# Patient Record
Sex: MALE | Race: WHITE | NOT HISPANIC OR LATINO | Employment: FULL TIME | URBAN - METROPOLITAN AREA
[De-identification: names, ages, dates, MRNs, and addresses within clinical notes are randomized per-mention and may not be internally consistent; named-entity substitution may affect disease eponyms.]

---

## 2017-12-22 ENCOUNTER — ALLSCRIPTS OFFICE VISIT (OUTPATIENT)
Dept: OTHER | Facility: OTHER | Age: 22
End: 2017-12-22

## 2017-12-23 NOTE — PROGRESS NOTES
Assessment   1  Injury of finger of right hand (959 5) (S69 91XA)   2  Subungual hematoma of fingernail, initial encounter (923 3) (S60 10XA)   3  Body mass index (BMI) 21 0-21 9, adult (V85 1) (Z68 21)    Plan   Injury of finger of right hand    · Sulfamethoxazole-Trimethoprim 800-160 MG Oral Tablet; TAKE 1 TABLET TWICE    DAILY UNTIL FINISHED  Injury of finger of right hand, Subungual hematoma of fingernail, initial encounter    · Incision & drainage abscess simple single - POC; Status:Active - Perform Order;    Requested for:[id number];     Discussion/Summary      Area cleansed with betadine and alcohol  Incision made with 20g needle and small amount of sanguineous drainage expressed  Site cleansed, antibiotic ointment and dressing applied  Advised on war soaks, will cover with antibiotics  follow up for worsening redness, pain, swelling, or purulent drainage of finger  up to date  Possible side effects of new medications were reviewed with the patient/guardian today  The treatment plan was reviewed with the patient/guardian  The patient/guardian understands and agrees with the treatment plan      Chief Complaint   right hand middle finger while at home- smashed it pretty hard      History of Present Illness   HPI: Here today with injury of right third finger that he sustained a few days ago after a heavy tire fell on his finger  The tip of his finger has been bruised with blood underneath the nail  this morning he noticed significant swelling of the distal aspect of the finger with some erythema at base of the nail  attempted to drain the blood under his nail which was successful  significant pain, has mild discomfort if nail is pushed hard purulent drainage  Review of Systems        Constitutional: no fever or chills, feels well, no tiredness, no recent weight loss or weight gain  Musculoskeletal: as noted in HPI  Integumentary: as noted in HPI  Active Problems   1   Allergic rhinitis (477 9) (J30 9)   2  Body mass index (BMI) 21 0-21 9, adult (V85 1) (Z68 21)   3  Rib pain (786 50) (R07 81)    Past Medical History   1  History of Abdominal pain, epigastric (789 06) (R10 13)   2  History of Acute gingivitis (523 00) (K05 00)   3  History of Acute upper respiratory infection (465 9) (J06 9)   4  History of acute gastritis (V12 70) (Z87 19)   5  History of diarrhea (V12 79) (Z87 898)   6  History of ingrown nail (V13 3) (Z87 2)   7  History of Noninfectious gastroenteritis (558 9) (K52 9)   8  History of Nonvenomous Insect Bite Of Shoulder (912 4)  Active Problems And Past Medical History Reviewed: The active problems and past medical history were reviewed and updated today  Family History   Mother    1  Family history of hypertension (V17 49) (Z82 49)  Father    2  Family history of hypertension (V17 49) (Z82 49)    Social History    · Never a smoker  The social history was reviewed and is unchanged  Current Meds    1  No Reported Medications Recorded     The medication list was reviewed and updated today  Allergies   1  Penicillins    Vitals    Recorded: [id number] 02:33PM   Temperature 98 F   Heart Rate 84   Respiration 16   Systolic 003   Diastolic 72   Height 6 ft    Weight 161 lb    BMI Calculated 21 84   BSA Calculated 1 94     Physical Exam        Constitutional      General appearance: No acute distress, well appearing and well nourished  Pulmonary      Respiratory effort: No increased work of breathing or signs of respiratory distress  Auscultation of lungs: Clear to auscultation, equal breath sounds bilaterally, no wheezes, no rales, no rhonci  Cardiovascular      Auscultation of heart: Normal rate and rhythm, normal S1 and S2, without murmurs  Examination of extremities for edema and/or varicosities: Normal        Skin right third finger distal joint ecchymotic and swollen  Subungual hematoma resolving   Swelling at base of nail, nail appears to be detache underneath skin surface  Psychiatric      Mood and affect: Normal           Results/Data   PHQ-2 Adult Depression Screening [id number] 02:39PM User, Ahs      Test Name Result Flag Reference   PHQ-2 Adult Depression Score 0     Over the last two weeks, how often have you been bothered by any of the following problems? Little interest or pleasure in doing things: Not at all - 0     Feeling down, depressed, or hopeless: Not at all - 0   PHQ-2 Adult Depression Screening Negative          Attending Note   Collaborating Physician Note: Collaborating Note: I agree with the Advanced Practitioner note        Signatures    Electronically signed by : Zechariah Balbuena; Dec 22 2017  4:41PM EST                       (Author)     Electronically signed by : Vivian Harley DO; Dec 23 2017 12:09AM EST                       (Author)

## 2018-01-22 VITALS
HEART RATE: 84 BPM | BODY MASS INDEX: 21.81 KG/M2 | DIASTOLIC BLOOD PRESSURE: 72 MMHG | RESPIRATION RATE: 16 BRPM | HEIGHT: 72 IN | SYSTOLIC BLOOD PRESSURE: 116 MMHG | TEMPERATURE: 98 F | WEIGHT: 161 LBS

## 2018-08-31 ENCOUNTER — OFFICE VISIT (OUTPATIENT)
Dept: FAMILY MEDICINE CLINIC | Facility: CLINIC | Age: 23
End: 2018-08-31
Payer: COMMERCIAL

## 2018-08-31 VITALS
HEIGHT: 72 IN | BODY MASS INDEX: 22.08 KG/M2 | WEIGHT: 163 LBS | DIASTOLIC BLOOD PRESSURE: 78 MMHG | RESPIRATION RATE: 16 BRPM | SYSTOLIC BLOOD PRESSURE: 110 MMHG | TEMPERATURE: 96.4 F | HEART RATE: 60 BPM

## 2018-08-31 DIAGNOSIS — Z00.00 ROUTINE ADULT HEALTH MAINTENANCE: Primary | ICD-10-CM

## 2018-08-31 DIAGNOSIS — J30.1 SEASONAL ALLERGIC RHINITIS DUE TO POLLEN: ICD-10-CM

## 2018-08-31 PROCEDURE — 99395 PREV VISIT EST AGE 18-39: CPT | Performed by: NURSE PRACTITIONER

## 2018-08-31 RX ORDER — FLUTICASONE PROPIONATE 50 MCG
2 SPRAY, SUSPENSION (ML) NASAL DAILY
Qty: 16 G | Refills: 3 | Status: SHIPPED | OUTPATIENT
Start: 2018-08-31

## 2018-08-31 NOTE — PATIENT INSTRUCTIONS

## 2018-08-31 NOTE — PROGRESS NOTES
FAMILY PRACTICE HEALTH MAINTENANCE OFFICE VISIT  [facility]    NAME: Ashley Love  AGE: 23 y o  SEX: male  : 1995     DATE: 2018    Assessment and Plan     Problem List Items Addressed This Visit        Respiratory    Allergic rhinitis    Relevant Medications    fluticasone (FLONASE) 50 mcg/act nasal spray      Other Visit Diagnoses     Routine adult health maintenance    -  Primary            · Patient Counseling:   · Nutrition: Stressed importance of a well balanced diet, moderation of sodium/saturated fat, caloric balance and sufficient intake of fiber  · Exercise: Stressed the importance of regular exercise with a goal of 150 minutes per week  · Dental Health: Discussed daily flossing and brushing and regular dental visits   · Sexuality: Discussed sexually transmitted infections, use of condoms and prevention of unintended pregnancy    · Immunizations reviewed: up to date  · Discussed benefits of screening   · Discussed the patient's BMI with him  The BMI is in the acceptable range     Return for Annual physical         Chief Complaint     Chief Complaint   Patient presents with    Physical Exam     no forms drhlpn       History of Present Illness     Here today for CPE  Seasonal allergies  He is a farmer    Requesting something for allergic rhinitis        Well Adult Physical   Patient here for a comprehensive physical exam       Diet and Physical Activity  Diet: well balanced diet  Weight concerns: None, patient's BMI is between 18 5-24 9  Exercise: frequently      Depression Screen  PHQ-9 Depression Screening    PHQ-9:    Frequency of the following problems over the past two weeks:       Little interest or pleasure in doing things:  0 - not at all  Feeling down, depressed, or hopeless:  0 - not at all  PHQ-2 Score:  0          General Health  Hearing: Normal:  bilateral  Vision: no vision problems  Dental: no dental visits for >1 year and brushes teeth twice daily    Reproductive Health  Monogamous relationship  Trying to conceive      The following portions of the patient's history were reviewed and updated as appropriate: allergies, current medications, past family history, past medical history, past social history, past surgical history and problem list     Review of Systems     Review of Systems   Constitutional: Negative for chills, fatigue and fever  HENT: Positive for congestion, sneezing and sore throat  Respiratory: Negative for cough, shortness of breath and wheezing  Cardiovascular: Negative for chest pain, palpitations and leg swelling  Gastrointestinal: Negative for abdominal pain, diarrhea, nausea and vomiting  Musculoskeletal: Negative for arthralgias and back pain  Skin: Negative for rash  Neurological: Negative for dizziness and headaches  Psychiatric/Behavioral: Negative  Past Medical History     History reviewed  No pertinent past medical history      Past Surgical History     Past Surgical History:   Procedure Laterality Date    CLEFT PALATE REPAIR         Social History     Social History     Social History    Marital status: Single     Spouse name: N/A    Number of children: N/A    Years of education: N/A     Social History Main Topics    Smoking status: Never Smoker    Smokeless tobacco: Current User    Alcohol use No    Drug use: No    Sexual activity: Yes     Partners: Female     Other Topics Concern    None     Social History Narrative    None       Family History     Family History   Problem Relation Age of Onset    Hypertension Mother     Hypertension Father     Diabetes Maternal Grandmother     Diabetes Maternal Grandfather     Substance Abuse Neg Hx     Mental illness Neg Hx        Current Medications       Current Outpatient Prescriptions:     fluticasone (FLONASE) 50 mcg/act nasal spray, 2 sprays into each nostril daily, Disp: 16 g, Rfl: 3     Allergies     Allergies   Allergen Reactions    Penicillins Rash     Reaction Date: [id number]; Annotation - [id number]: "Sometimes gets a rash"       Objective     /78   Pulse 60   Temp (!) 96 4 °F (35 8 °C)   Resp 16   Ht 6' (1 829 m)   Wt 73 9 kg (163 lb)   BMI 22 11 kg/m²      Physical Exam   Constitutional: He appears well-developed and well-nourished  HENT:   Head: Normocephalic and atraumatic  Right Ear: Tympanic membrane, external ear and ear canal normal    Left Ear: Tympanic membrane, external ear and ear canal normal    Nose: No mucosal edema  Mouth/Throat: Oropharynx is clear and moist and mucous membranes are normal    Eyes: Conjunctivae are normal    Neck: Normal range of motion and full passive range of motion without pain  Carotid bruit is not present  No edema present  No thyromegaly present  Cardiovascular: Normal rate, regular rhythm, normal heart sounds and intact distal pulses  No murmur heard  Pulmonary/Chest: Effort normal and breath sounds normal    Abdominal: Bowel sounds are normal  He exhibits no distension  There is no splenomegaly or hepatomegaly  There is no tenderness  Musculoskeletal: Normal range of motion  Lymphadenopathy:        Right cervical: No superficial cervical adenopathy present  Left cervical: No superficial cervical adenopathy present  Neurological: He has normal strength and normal reflexes  No sensory deficit  Skin: No rash noted  Psychiatric: He has a normal mood and affect  Nursing note and vitals reviewed           Visual Acuity Screening    Right eye Left eye Both eyes   Without correction: 20/20 20/20 20/20   With correction:          Health Maintenance     Health Maintenance   Topic Date Due    Depression Screening PHQ  1995    DTaP,Tdap,and Td Vaccines (1 - Tdap) 11/10/2014    INFLUENZA VACCINE  09/01/2018     Immunization History   Administered Date(s) Administered    DTaP / HiB 01/11/1994, 03/15/1994, 05/13/1994, 05/10/1995    DTaP 5 03/25/1999  Hep B, adult 1993, 01/11/1994, 06/10/1994    Influenza TIV (IM) 12/20/2010    MMR 02/15/1995, 03/25/1999    Meningococcal, Unknown Serogroups 12/20/2010    OPV 01/11/1994, 03/15/1994, 05/10/1995, 03/25/1999    Tdap 12/20/2010    Varicella 08/09/1995, 12/20/2010       Madina Segovia, [id number] [facility]